# Patient Record
Sex: MALE | Race: WHITE | Employment: FULL TIME | URBAN - METROPOLITAN AREA
[De-identification: names, ages, dates, MRNs, and addresses within clinical notes are randomized per-mention and may not be internally consistent; named-entity substitution may affect disease eponyms.]

---

## 2018-12-10 PROCEDURE — 88305 TISSUE EXAM BY PATHOLOGIST: CPT | Performed by: INTERNAL MEDICINE

## 2021-08-11 PROBLEM — S86.111A GASTROCNEMIUS STRAIN, RIGHT, INITIAL ENCOUNTER: Status: ACTIVE | Noted: 2021-08-11

## 2024-12-23 ENCOUNTER — HOSPITAL ENCOUNTER (OUTPATIENT)
Dept: CT IMAGING | Facility: HOSPITAL | Age: 64
End: 2024-12-23
Attending: FAMILY MEDICINE

## 2024-12-23 DIAGNOSIS — Z13.6 ENCOUNTER FOR SCREENING FOR CARDIOVASCULAR DISORDERS: ICD-10-CM

## 2024-12-23 NOTE — PROGRESS NOTES
Date of Service 12/23/2024    ROBERT PALAFOX  Date of Birth 8/8/1960    Patient Age: 64 year old    PCP: Prem Ferro MD  [address] [place] [id number]    Heart Scan Consult  Preliminary Heart Scan Score: 130    Previous Screening  Heart Scan Completed Previously: Yes  Year of last heart scan: 2016  Score of last heart scan: 34  Peripheral Vascular Scan Completed Previously: No          Risk Factors  Personal Risk Factors  Non-alterable Risk Factors: Personal History;Age;Gender;Family History  Alterable Risk Factors: Abnormal Cholesterol        Blood Pressure  There were no vitals taken for this visit.  (Normal =< 120/80,  Elevated = 120-129/ >80,  High Stage1 130-139/80-89 , Stage2 >140/>90)    Lipid Profile  Not completed, patient declined testing.    Cholesterol Goals  Value   Total  =< 200   HDL  = > 45 Men = > 55 Women   LDL   =< 100   Triglycerides  =< 150       Glucose and Hemoglobin A1C  No results found for: \"PGLU\", \"GLU\", \"A1C\"  (Normal Fasting Glucose < 100mg/dl )    Nurse Review  Risk factor information and results reviewed with Nurse: Yes    Recommended Follow Up:  Consult your physician regarding::   Final Heart Scan Report;  Discuss potential for Incidental Finding;  Discuss Potential for Score Variance      Recommendations for Change:  Nutrition Changes: Low Saturated Fat;Low Fat Dairy;Increase Fiber    Cholesterol Modification (goal of therapy depends upon your risk):   Increase HDL (Healthy/Good) Normal >45 Men >55 Women;  Decrease LDL (Lousy/Bad) Ideal <100;  Decrease Triglycerides (Ugly) Normal <150    Exercise: Enhance Current Program                   Repeat Heart Scan: 3 Years if Calcium Score is > 0.0;Discuss with your Physician              [place] Recommended Resources:  Recommended Resources: Upcoming Classes, Medical Services and [employer] [email or web address];PV Screening  Recommended PV Screening: Carotids;Abdomen;Ankle-Brachial Index (JUAN J)  Other Resources:: Educational  handouts provided.      Ronal VASQUEZ RN        Please Contact the Nurse Heart Line with any Questions or Concerns [phone].